# Patient Record
Sex: FEMALE | Race: WHITE | NOT HISPANIC OR LATINO | URBAN - METROPOLITAN AREA
[De-identification: names, ages, dates, MRNs, and addresses within clinical notes are randomized per-mention and may not be internally consistent; named-entity substitution may affect disease eponyms.]

---

## 2017-06-25 ENCOUNTER — INPATIENT (INPATIENT)
Facility: HOSPITAL | Age: 82
LOS: 2 days | Discharge: HOME | End: 2017-06-28

## 2017-06-25 DIAGNOSIS — I25.10 ATHEROSCLEROTIC HEART DISEASE OF NATIVE CORONARY ARTERY WITHOUT ANGINA PECTORIS: ICD-10-CM

## 2017-06-25 DIAGNOSIS — E05.90 THYROTOXICOSIS, UNSPECIFIED WITHOUT THYROTOXIC CRISIS OR STORM: ICD-10-CM

## 2017-06-25 DIAGNOSIS — I34.0 NONRHEUMATIC MITRAL (VALVE) INSUFFICIENCY: ICD-10-CM

## 2017-06-25 DIAGNOSIS — R55 SYNCOPE AND COLLAPSE: ICD-10-CM

## 2017-06-25 DIAGNOSIS — I47.1 SUPRAVENTRICULAR TACHYCARDIA: ICD-10-CM

## 2017-07-01 DIAGNOSIS — R55 SYNCOPE AND COLLAPSE: ICD-10-CM

## 2017-07-01 DIAGNOSIS — E83.42 HYPOMAGNESEMIA: ICD-10-CM

## 2017-07-01 DIAGNOSIS — I50.33 ACUTE ON CHRONIC DIASTOLIC (CONGESTIVE) HEART FAILURE: ICD-10-CM

## 2017-07-01 DIAGNOSIS — E86.1 HYPOVOLEMIA: ICD-10-CM

## 2017-07-01 DIAGNOSIS — I11.0 HYPERTENSIVE HEART DISEASE WITH HEART FAILURE: ICD-10-CM

## 2017-07-01 DIAGNOSIS — I25.10 ATHEROSCLEROTIC HEART DISEASE OF NATIVE CORONARY ARTERY WITHOUT ANGINA PECTORIS: ICD-10-CM

## 2017-07-01 DIAGNOSIS — R53.1 WEAKNESS: ICD-10-CM

## 2017-07-01 DIAGNOSIS — E87.1 HYPO-OSMOLALITY AND HYPONATREMIA: ICD-10-CM

## 2017-07-01 DIAGNOSIS — I25.2 OLD MYOCARDIAL INFARCTION: ICD-10-CM

## 2017-07-01 DIAGNOSIS — E03.9 HYPOTHYROIDISM, UNSPECIFIED: ICD-10-CM

## 2017-07-13 DIAGNOSIS — I10 ESSENTIAL (PRIMARY) HYPERTENSION: ICD-10-CM

## 2017-08-14 PROBLEM — Z00.00 ENCOUNTER FOR PREVENTIVE HEALTH EXAMINATION: Status: ACTIVE | Noted: 2017-08-14

## 2017-09-29 ENCOUNTER — INPATIENT (INPATIENT)
Facility: HOSPITAL | Age: 82
LOS: 0 days | Discharge: HOME | End: 2017-09-29
Attending: EMERGENCY MEDICINE

## 2017-09-29 DIAGNOSIS — I34.0 NONRHEUMATIC MITRAL (VALVE) INSUFFICIENCY: ICD-10-CM

## 2017-09-29 DIAGNOSIS — R55 SYNCOPE AND COLLAPSE: ICD-10-CM

## 2017-09-29 DIAGNOSIS — N39.0 URINARY TRACT INFECTION, SITE NOT SPECIFIED: ICD-10-CM

## 2017-09-29 DIAGNOSIS — I25.10 ATHEROSCLEROTIC HEART DISEASE OF NATIVE CORONARY ARTERY WITHOUT ANGINA PECTORIS: ICD-10-CM

## 2017-09-29 DIAGNOSIS — I35.8 OTHER NONRHEUMATIC AORTIC VALVE DISORDERS: ICD-10-CM

## 2017-09-29 DIAGNOSIS — I47.1 SUPRAVENTRICULAR TACHYCARDIA: ICD-10-CM

## 2017-09-29 DIAGNOSIS — R10.13 EPIGASTRIC PAIN: ICD-10-CM

## 2017-09-29 DIAGNOSIS — R11.0 NAUSEA: ICD-10-CM

## 2017-09-29 DIAGNOSIS — I25.2 OLD MYOCARDIAL INFARCTION: ICD-10-CM

## 2017-09-29 DIAGNOSIS — E05.90 THYROTOXICOSIS, UNSPECIFIED WITHOUT THYROTOXIC CRISIS OR STORM: ICD-10-CM

## 2017-09-29 DIAGNOSIS — R10.9 UNSPECIFIED ABDOMINAL PAIN: ICD-10-CM

## 2017-12-21 ENCOUNTER — INPATIENT (INPATIENT)
Facility: HOSPITAL | Age: 82
LOS: 5 days | Discharge: HOME | End: 2017-12-27
Attending: INTERNAL MEDICINE

## 2017-12-21 DIAGNOSIS — I34.0 NONRHEUMATIC MITRAL (VALVE) INSUFFICIENCY: ICD-10-CM

## 2017-12-21 DIAGNOSIS — I47.1 SUPRAVENTRICULAR TACHYCARDIA: ICD-10-CM

## 2017-12-21 DIAGNOSIS — E05.90 THYROTOXICOSIS, UNSPECIFIED WITHOUT THYROTOXIC CRISIS OR STORM: ICD-10-CM

## 2017-12-21 DIAGNOSIS — R55 SYNCOPE AND COLLAPSE: ICD-10-CM

## 2017-12-21 DIAGNOSIS — I25.10 ATHEROSCLEROTIC HEART DISEASE OF NATIVE CORONARY ARTERY WITHOUT ANGINA PECTORIS: ICD-10-CM
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
## 2018-01-03 DIAGNOSIS — E83.42 HYPOMAGNESEMIA: ICD-10-CM

## 2018-01-03 DIAGNOSIS — R10.32 LEFT LOWER QUADRANT PAIN: ICD-10-CM

## 2018-01-03 DIAGNOSIS — I35.0 NONRHEUMATIC AORTIC (VALVE) STENOSIS: ICD-10-CM

## 2018-01-03 DIAGNOSIS — Z99.81 DEPENDENCE ON SUPPLEMENTAL OXYGEN: ICD-10-CM

## 2018-01-03 DIAGNOSIS — I34.0 NONRHEUMATIC MITRAL (VALVE) INSUFFICIENCY: ICD-10-CM
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
## 2018-01-03 DIAGNOSIS — I25.2 OLD MYOCARDIAL INFARCTION: ICD-10-CM

## 2018-01-03 DIAGNOSIS — K40.30 UNILATERAL INGUINAL HERNIA, WITH OBSTRUCTION, WITHOUT GANGRENE, NOT SPECIFIED AS RECURRENT: ICD-10-CM

## 2018-01-03 DIAGNOSIS — I47.1 SUPRAVENTRICULAR TACHYCARDIA: ICD-10-CM

## 2018-01-03 DIAGNOSIS — J44.9 CHRONIC OBSTRUCTIVE PULMONARY DISEASE, UNSPECIFIED: ICD-10-CM
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
## 2018-01-03 DIAGNOSIS — E87.1 HYPO-OSMOLALITY AND HYPONATREMIA: ICD-10-CM

## 2018-01-03 DIAGNOSIS — R64 CACHEXIA: ICD-10-CM

## 2018-01-03 DIAGNOSIS — S93.402A SPRAIN OF UNSPECIFIED LIGAMENT OF LEFT ANKLE, INITIAL ENCOUNTER: ICD-10-CM
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
## 2018-01-03 DIAGNOSIS — I25.10 ATHEROSCLEROTIC HEART DISEASE OF NATIVE CORONARY ARTERY WITHOUT ANGINA PECTORIS: ICD-10-CM

## 2018-10-11 ENCOUNTER — INPATIENT (INPATIENT)
Facility: HOSPITAL | Age: 83
LOS: 2 days | End: 2018-10-14
Attending: INTERNAL MEDICINE | Admitting: INTERNAL MEDICINE

## 2018-10-11 VITALS
OXYGEN SATURATION: 98 % | HEART RATE: 170 BPM | RESPIRATION RATE: 18 BRPM | DIASTOLIC BLOOD PRESSURE: 127 MMHG | SYSTOLIC BLOOD PRESSURE: 170 MMHG

## 2018-10-11 RX ORDER — MORPHINE SULFATE 50 MG/1
5 CAPSULE, EXTENDED RELEASE ORAL
Qty: 0 | Refills: 0 | Status: DISCONTINUED | OUTPATIENT
Start: 2018-10-11 | End: 2018-10-14

## 2018-10-11 RX ADMIN — Medication 1 APPLICATION(S): at 21:37

## 2018-10-11 NOTE — ED ADULT NURSE NOTE - NSIMPLEMENTINTERV_GEN_ALL_ED
Implemented All Fall with Harm Risk Interventions:  [place] to call system. Call bell, personal items and telephone within reach. Instruct patient to call for assistance. Room bathroom lighting operational. Non-slip footwear when patient is off stretcher. Physically safe environment: no spills, clutter or unnecessary equipment. Stretcher in lowest position, wheels locked, appropriate side rails in place. Provide visual cue, wrist band, yellow gown, etc. Monitor gait and stability. Monitor for mental status changes and reorient to person, place, and time. Review medications for side effects contributing to fall risk. Reinforce activity limits and safety measures with patient and family. Provide visual clues: red socks.

## 2018-10-11 NOTE — H&P ADULT - NSHPREVIEWOFSYSTEMS_GEN_ALL_CORE
Patient has difficulty speaking due to SOB; but is reluctant to take morphine.   daughter (HCP) states that patient has been declining and does not want escalation of care.   Detailed ROS deferred

## 2018-10-11 NOTE — ED ADULT NURSE NOTE - OBJECTIVE STATEMENT
pt brought in by ems and family, pt found to be in rapid A-fib, family at bedside. pt denies chest pain, is diaphoretic and short of breath. pt is a&o 3, as per pt and family would like to be enrolled in advanced illness, pt does not want any aggressive treatment. palliative care at bedside.

## 2018-10-11 NOTE — ED PROVIDER NOTE - OBJECTIVE STATEMENT
82 Y/O F HTN, CAD, AFIB, COPD, ON HOME OXYGEN BROUGHT TO ED [facility] -2 WEEKS OF PROGRESSIVELY WORSENING SOB. PT WITH FAILURE TO THRIVE AT HOME. PT WITH DECREASED APPETITE AND PO INTAKE. PT WITH WORSENING WEAKNESS AND DECREASED URINATION. PT C/O CP TO DAUGHTER OVER THE PAST 2-3 DAYS. PT'S DAUGHTER WAS IN THE PROCESS OF ARRANGING HOSPICE CARE. 82 Y/O F HTN, CAD, AFIB, COPD, ON HOME OXYGEN BROUGHT TO ED WITH -2 WEEKS OF PROGRESSIVELY WORSENING SOB. PT WITH FAILURE TO THRIVE AT HOME. PT WITH DECREASED APPETITE AND PO INTAKE. PT WITH WORSENING WEAKNESS AND DECREASED URINATION. PT C/O CP TO DAUGHTER OVER THE PAST 2-3 DAYS. PT'S DAUGHTER WAS IN THE PROCESS OF ARRANGING HOSPICE CARE.

## 2018-10-11 NOTE — H&P ADULT - REASON FOR ADMISSION
Brought in by emt who spent 1 hour on scene convincing her that her symptoms (SOB) needed to be addressed by hospital evaluation

## 2018-10-11 NOTE — H&P ADULT - ASSESSMENT
83yFemale being evaluated for goals of care and symptom management; Reema Tirado HCP and daughter    No escalation of care; appropriate AI - reviewed with Dr. Jaimes; All questions answered.     Recommendations:  Comfort measures Only - no blood draws/VS/O2 Sats  Transfer to  - spoke to Dr. Richardson and Asst Nurse Yi, Ale  Concentrated morphine 5mg every 2H PRN for pain/dyspnea/distress.    DNR/I    Please Call [phone] PRN

## 2018-10-11 NOTE — H&P ADULT - HISTORY OF PRESENT ILLNESS
has been home bound/couch/living room since April and refusing disease directed care; over the last two weeks had allowed a home care doc/np and was transitioning to appropriate level of care; since weekend has been short of breath with increasing lethargy; not eating over the last two days    Dr. Cohen  84 Y/O F HTN, CAD, AFIB, COPD, [facility]EEKS OF PROGRESSIVELY WORSENING SOB. PT WITH FAILURE TO THRIVE AT HOME. PT WITH DECREASED APPETITE AND PO INTAKE. PT WITH WORSENING WEAKNESS AND DECREASED URINATION. PT C/O CP TO DAUGHTER OVER THE PAST 2-3 DAYS. PT'S DAUGHTER WAS IN THE PROCESS OF ARRANGING HOSPICE CARE.

## 2018-10-11 NOTE — ED PROVIDER NOTE - PROGRESS NOTE DETAILS
REMY RN IN ED, PT TO BE ADMITTED TO  BED FOR COMFORT CARE ONLY. CASE D/W DR. GRAVES. WILL ADMIT TO  BED IN VENT UNIT. DAUGHTER AT BEDSIDE. DNI/DNR SIGNED AND SCANNED TO CHART

## 2018-10-11 NOTE — H&P ADULT - NSHPPHYSICALEXAM_GEN_ALL_CORE
Overall chronically ill and frail older adult; in end stag of life  A&O to x3 - but lethargic; blind  RR 30 and deep O2 nc no wheezes/no rhoncal fremitus  Abd soft  +edema of lower extremities; right left with posterior chronic wounds

## 2018-10-11 NOTE — ED PROVIDER NOTE - PHYSICAL EXAMINATION
CONSTITUTIONAL: Thin, cachectic, kyphotic  HEAD: Normocephalic; atraumatic.   EYES: PERRL; EOM intact. Conjunctiva normal B/L.   ENT: Normal pharynx with no tonsillar hypertrophy. Dry mucous membranes.   NECK: Supple; non-tender; no cervical lymphadenopathy.   CHEST: Normal chest excursion with respiration.   CARDIOVASCULAR: Irreg irreg rhtyhm. Tachycardia ; no murmurs, rubs, or gallops.   RESPIRATORY: Normal chest excursion with respiration; breath sounds clear and equal bilaterally; no wheezes, rhonchi, or rales.  GI/: Normal bowel sounds; non-distended; non-tender.  BACK: No evidence of trauma or deformity. Non-tender to palpation. No CVA tenderness.   EXT: skin breakdown L posterior leg. NO cellulitis  NEURO: A & O x 3; CN 2-12 intact. Grossly unremarkable.

## 2018-10-11 NOTE — ED PROVIDER NOTE - MEDICAL DECISION MAKING DETAILS
84 Y/O F BROUGHT TO ED FOR ADMISSION TO HOSPICE. DAUGHTER AT BEDSIDE. DNI/DNR SIGNED. PT EVALUATED BY PALLIATIVE AND ADMITTED TO  BED.

## 2018-10-12 VITALS — WEIGHT: 137.35 LBS

## 2018-10-12 RX ORDER — SCOPALAMINE 1 MG/3D
1 PATCH, EXTENDED RELEASE TRANSDERMAL
Qty: 0 | Refills: 0 | Status: DISCONTINUED | OUTPATIENT
Start: 2018-10-12 | End: 2018-10-14

## 2018-10-12 RX ORDER — FUROSEMIDE 40 MG
20 TABLET ORAL ONCE
Qty: 0 | Refills: 0 | Status: COMPLETED | OUTPATIENT
Start: 2018-10-12 | End: 2018-10-12

## 2018-10-12 RX ADMIN — MORPHINE SULFATE 5 MILLIGRAM(S): 50 CAPSULE, EXTENDED RELEASE ORAL at 09:26

## 2018-10-12 RX ADMIN — Medication 20 MILLIGRAM(S): at 10:52

## 2018-10-12 RX ADMIN — Medication 1 APPLICATION(S): at 18:03

## 2018-10-12 RX ADMIN — MORPHINE SULFATE 5 MILLIGRAM(S): 50 CAPSULE, EXTENDED RELEASE ORAL at 08:55

## 2018-10-12 RX ADMIN — SCOPALAMINE 1 PATCH: 1 PATCH, EXTENDED RELEASE TRANSDERMAL at 10:58

## 2018-10-12 NOTE — PROGRESS NOTE ADULT - ASSESSMENT
83yFemale being evaluated for comfort measures only    82 y/o female with PMH of HTN, CAD, AFIB, COPD o2 dependent, who was admitted for worsening shortness and breath, decreased urine output and failure to thrive. Patient is alert and oriented x3, has been refusing any intervention previously. Patient/family was seen by palliative team for overall goals of care. Family stated that patient has had a progressive decline in status and functionality over the past months, and they were considering hospice prior to admission. Patient/family decided to opt for comfort measures only with no aggressive/invasive intervention.     On assessment, patient was found to be tachypneic  RR 26, with difficulty speaking . Patient had been refusing use of morphine,  patient education given and she is agreeable to it. B/l rhonchi heard on ascultation, Lasix 20mg iv x1 will be given. Daughter at bedside, and supportive care provider.      Recommendations:  Lasix 20mg iv x1  Roxanol Prn  CMO  Hospice c/s   DNI/DNR  We will continue to monitor 83yFemale being evaluated for comfort measures only    82 y/o female with PMH of HTN, CAD, AFIB, COPD o2 dependent, who was admitted for worsening shortness and breath, decreased urine output and failure to thrive. Patient is alert and oriented x3, has been refusing any intervention previously. Patient/family was seen by palliative team for overall goals of care. Family stated that patient has had a progressive decline in status and functionality over the past months, and they were considering hospice prior to admission. Patient/family decided to opt for comfort measures only with no aggressive/invasive intervention.     On assessment, patient was found to be tachypneic  RR 26, with difficulty speaking . Patient had been refusing use of morphine,  patient education given and she is agreeable to it. B/l rhonchi heard on ascultation, Lasix 20mg iv x1 will be given. Daughter at bedside, and supportive care provider.      Recommendations:  Lasix 20mg iv x1  scopolamine patch q72hr   Roxanol Prn  CMO  Hospice c/s   DNI/DNR  We will continue to monitor 83yFemale being evaluated for comfort measures only    84 y/o female with PMH of HTN, CAD, AFIB, COPD o2 dependent, who was admitted for worsening shortness and breath, decreased urine output and failure to thrive. Patient is alert and oriented x3, has been refusing any intervention previously. Patient/family was seen by palliative team for overall goals of care. Family stated that patient has had a progressive decline in status and functionality over the past months, and they were considering hospice prior to admission. Patient/family decided to opt for comfort measures only with no aggressive/invasive intervention.     On assessment, patient was found to be tachypneic  RR 26, with difficulty speaking . Patient had been refusing use of morphine,  patient education given and she is agreeable to it. B/l rhonchi heard on ascultation, Lasix 20mg iv x1 will be given. Daughter at bedside, and supportive care provider.      Recommendations:  Lasix 20mg iv x1  scopolamine patch q72hr   Roxanol Prn  wound Care with silvadene + DSD   CMO  Hospice c/s   DNI/DNR  We will continue to monitor

## 2018-10-12 NOTE — PROGRESS NOTE ADULT - SUBJECTIVE AND OBJECTIVE BOX
83yFemale  admitted for Patient is a 83y old  Female who presents with a chief complaint of shortness of Breath       Patient seen and examined, denies any pain, but in mild respiratory distress.            HOSPITAL DAY NO: 2        Past Medical History  Hypertension  CAD  AFIB  COPD (o2 de[pendent)                                     Physical Examination  Chronically ill frail female, alert /oriented, mild distress  + b/l rhonchi, dyspneic,  + 4lnc  S1S2 irregular  soft non distended/tender  + edema     Nutrition:                 __PEG __OG __NG  _x__ORAL   ___TPN      LABS:                                                                                                                                                                      MEDICATIONS  (STANDING):  silver sulfADIAZINE 1% Cream 1 Application(s) Topical daily    MEDICATIONS  (PRN):  morphine Concentrate 5 milliGRAM(s) Oral every 2 hours PRN pain/dyspnea/distress 83yFemale  admitted for Patient is a 83y old  Female who presents with a chief complaint of shortness of Breath       Patient seen and examined, denies any pain, but in mild respiratory distress.            HOSPITAL DAY NO: 2        Past Medical History  Hypertension  CAD  AFIB  COPD (o2 de[pendent)                                     Physical Examination  Chronically ill frail female, alert /oriented, mild distress  + b/l rhonchi, dyspneic,  + 4lnc  S1S2 irregular  soft non distended/tender  + edema   skin: B/l lower extremities wound     Nutrition:                 __PEG __OG __NG  _x__ORAL   ___TPN      LABS:                                                                                                                                                                      MEDICATIONS  (STANDING):  silver sulfADIAZINE 1% Cream 1 Application(s) Topical daily    MEDICATIONS  (PRN):  morphine Concentrate 5 milliGRAM(s) Oral every 2 hours PRN pain/dyspnea/distress

## 2018-10-13 RX ADMIN — MORPHINE SULFATE 5 MILLIGRAM(S): 50 CAPSULE, EXTENDED RELEASE ORAL at 15:36

## 2018-10-13 RX ADMIN — MORPHINE SULFATE 5 MILLIGRAM(S): 50 CAPSULE, EXTENDED RELEASE ORAL at 22:33

## 2018-10-13 RX ADMIN — Medication 1 APPLICATION(S): at 11:59

## 2018-10-13 RX ADMIN — MORPHINE SULFATE 5 MILLIGRAM(S): 50 CAPSULE, EXTENDED RELEASE ORAL at 21:48

## 2018-10-13 NOTE — CHART NOTE - NSCHARTNOTEFT_GEN_A_CORE
Registered Dietitian Limited Follow Up     Patient/family was seen by palliative team for overall goals of care. Family stated that patient has had a progressive decline in status and functionality over the past months, and they were considering hospice prior to admission. Patient/family decided to opt for comfort measures only with no aggressive/invasive intervention. No further nutritional interventions at this time; RD to sign off, recall prn.

## 2018-10-13 NOTE — PROGRESS NOTE ADULT - REASON FOR ADMISSION
Brought in by emt who spent 1 hour on scene convincing her that her symptoms (SOB) needed to be addressed by hospital evaluation
Brought in by emt who spent 1 hour on scene convincing her that her symptoms (SOB) needed to be addressed by hospital evaluation

## 2018-10-14 ENCOUNTER — TRANSCRIPTION ENCOUNTER (OUTPATIENT)
Age: 83
End: 2018-10-14

## 2018-10-14 RX ADMIN — MORPHINE SULFATE 5 MILLIGRAM(S): 50 CAPSULE, EXTENDED RELEASE ORAL at 06:07

## 2018-10-14 RX ADMIN — MORPHINE SULFATE 5 MILLIGRAM(S): 50 CAPSULE, EXTENDED RELEASE ORAL at 05:52

## 2018-10-14 NOTE — DISCHARGE NOTE FOR THE EXPIRED PATIENT - HOSPITAL COURSE
82 y/o female with PMH of HTN, CAD, AFIB, COPD o2 dependent, who was admitted for worsening shortness and breath, decreased urine output and failure to thrive. Patient is alert and oriented x3, has been refusing any intervention previously. Patient/family was seen by palliative team for overall goals of care. Family stated that patient has had a progressive decline in status and functionality over the past months, and they were considering hospice prior to admission. Patient/family decided to opt for comfort measures only with no aggressive/invasive intervention and DNR, DNI.

## 2018-10-24 DIAGNOSIS — R62.7 ADULT FAILURE TO THRIVE: ICD-10-CM

## 2018-10-24 DIAGNOSIS — R06.02 SHORTNESS OF BREATH: ICD-10-CM

## 2018-10-24 DIAGNOSIS — Z66 DO NOT RESUSCITATE: ICD-10-CM

## 2018-10-24 DIAGNOSIS — J96.90 RESPIRATORY FAILURE, UNSPECIFIED, UNSPECIFIED WHETHER WITH HYPOXIA OR HYPERCAPNIA: ICD-10-CM

## 2018-10-24 DIAGNOSIS — I48.91 UNSPECIFIED ATRIAL FIBRILLATION: ICD-10-CM

## 2018-10-24 DIAGNOSIS — I25.10 ATHEROSCLEROTIC HEART DISEASE OF NATIVE CORONARY ARTERY WITHOUT ANGINA PECTORIS: ICD-10-CM

## 2018-10-24 DIAGNOSIS — Z51.5 ENCOUNTER FOR PALLIATIVE CARE: ICD-10-CM

## 2018-10-24 DIAGNOSIS — I10 ESSENTIAL (PRIMARY) HYPERTENSION: ICD-10-CM

## 2018-10-24 DIAGNOSIS — J44.9 CHRONIC OBSTRUCTIVE PULMONARY DISEASE, UNSPECIFIED: ICD-10-CM

## 2018-10-24 DIAGNOSIS — Z87.891 PERSONAL HISTORY OF NICOTINE DEPENDENCE: ICD-10-CM
